# Patient Record
Sex: MALE | URBAN - METROPOLITAN AREA
[De-identification: names, ages, dates, MRNs, and addresses within clinical notes are randomized per-mention and may not be internally consistent; named-entity substitution may affect disease eponyms.]

---

## 2024-09-24 ENCOUNTER — OFFICE VISIT (OUTPATIENT)
Dept: URBAN - METROPOLITAN AREA CLINIC 105 | Facility: CLINIC | Age: 29
End: 2024-09-24

## 2024-09-24 RX ORDER — GABAPENTIN 100 MG/1
TAKE ONE TO TWO CAPSULES BY MOUTH AT BEDTIME AS NEEDED FOR NECK OR NERVE PAIN CAPSULE ORAL
Qty: 60 UNSPECIFIED | Refills: 0 | Status: ACTIVE | COMMUNITY

## 2024-09-24 RX ORDER — OMEPRAZOLE 20 MG/1
TAKE ONE CAPSULE BY MOUTH EVERY MORNING FOR 20 DAYS CAPSULE, DELAYED RELEASE ORAL
Qty: 20 UNSPECIFIED | Refills: 0 | Status: ACTIVE | COMMUNITY

## 2024-09-24 RX ORDER — MELOXICAM 15 MG/1
TAKE ONE TABLET BY MOUTH ONE TIME DAILY WITH FOOD AS NEEDED FOR PAIN TABLET ORAL
Qty: 90 UNSPECIFIED | Refills: 0 | Status: ACTIVE | COMMUNITY

## 2024-09-24 RX ORDER — BACLOFEN 10 MG/1
TAKE ONE TABLET BY MOUTH THREE TIMES A DAY FOR 10 DAYS TABLET ORAL
Qty: 20 UNSPECIFIED | Refills: 0 | Status: ACTIVE | COMMUNITY

## 2024-09-24 RX ORDER — METHYLPREDNISOLONE 4 MG/1
TAKE BY MOUTH IN THE MORNING . TAKE AS DIRECTED ON PACKAGE TABLET ORAL
Qty: 21 UNSPECIFIED | Refills: 0 | Status: ACTIVE | COMMUNITY

## 2024-09-24 RX ORDER — TRAZODONE HYDROCHLORIDE 100 MG/1
TAKE ONE-HALF TO ONE TABLET BY MOUTH AT BEDTIME AS NEEDED FOR INSOMNIA TABLET ORAL
Qty: 90 UNSPECIFIED | Refills: 0 | Status: ACTIVE | COMMUNITY

## 2024-09-24 NOTE — HPI-ZZZTODAY'S VISIT
29-year-old male with no significant PMH, presenting to discuss hiccups and hiatal hernia.  He was seen at ED 9/16/2024 reporting hiccups for 30 hours.  He reported 1 episode of N/V.  CT A/P with IV contrast showed small sliding hiatal hernia with mild wall thickening along distal esophagus and GEJ, very small fat-containing umbilical hernia.  He was given metoclopramide, baclofen, famotidine, and fluids, and discharged with prescription for baclofen and omeprazole 20 mg. Labs 9/17/2024:ALT 50, CMP otherwise normal.  CBC, TSH normal. 9/16/2024:ALT 73, glucose 143, CMP otherwise normal.  WBC 16 with elevated neutrophils/monocytes, CBC otherwise normal.  Lipase normal. Fer